# Patient Record
Sex: MALE | Race: WHITE | Employment: FULL TIME | URBAN - METROPOLITAN AREA
[De-identification: names, ages, dates, MRNs, and addresses within clinical notes are randomized per-mention and may not be internally consistent; named-entity substitution may affect disease eponyms.]

---

## 2022-09-20 ENCOUNTER — HOSPITAL ENCOUNTER (OUTPATIENT)
Dept: PHYSICAL THERAPY | Age: 57
Discharge: HOME | End: 2022-09-20
Payer: COMMERCIAL

## 2022-09-20 PROCEDURE — 97162 PT EVAL MOD COMPLEX 30 MIN: CPT

## 2022-09-20 PROCEDURE — 97116 GAIT TRAINING THERAPY: CPT

## 2022-09-20 PROCEDURE — 97110 THERAPEUTIC EXERCISES: CPT

## 2022-09-20 NOTE — PROGRESS NOTES
[address] [place] [address] PHYSICAL THERAPY AT [address] [place] [address]  Saeid Haider [address], [address] [address] ,[address], [address]  Phone: [phone]  Fax: [phone] / [address] [address] PHYSICAL THERAPY SERVICES  Patient Name: Hazel Lyle : 1965   Medical   Diagnosis: Left knee pain [M25.562] Treatment Diagnosis: L knee pain   Onset Date: 2022     Referral Source: Jd Smallwood, * Start of Care [facility] [place]): 2022   Prior Hospitalization: See medical history Provider #: [id number]   Prior Level of Function: Walking 2-3 miles 4-5x/week   Comorbidities: H/o L knee menisectomy    Medications: Verified on Patient Summary List   The Plan of Care and following information is based on the information from the initial evaluation.   ===========================================================================================  Assessment / key information:  Patient is a 59 y.o. male who presents to [facility] with complaints of L knee pain after a fall on 2022. Pt reports slipping in shower and falling posterior when his foot was anchored in the corner. MRI on 2022 revealed complete rupture of ACL, MCL  and medial patellofemoral ligament, near complete ruprutre of PCL, complex tear of med meniscus and contusions of lat/med tibial plateau, femoral condyle and tear of joint capsule. Pt reports in recent weeks symptoms have steadily decreased but he is still feeling moments of instability and has not been able to resume recreational walking program. Knee bucking is dec in incidence but still occurs at least 1x/day He is ambulating with a walking stick as needed but  has not been going to store or leaving house out of fear of falling. Pt reports pain has significantly decreased in recent weeks, states occasional 1/10 aching pain with increasing activity level.  Pt presents to clinic with collateral stabilization brace today ambulating with short step lengths, wide DESTINY, dec TKE and significant L lateral trunk lean. L knee ROM approx 5-130 deg (compared to 0-140 on R). QS was fair although there is notable atrophy of L quad specifically VMO. Pt was unable to perform SLR without lag and required PT assistance. There is also some swelling in medial/superior knee and into L calf; no tenderness, redness or warmth. Lachman test positive without end feel, sag sign positive in flexion, MCL laxity testing caused some discomfort and there was TTP along lateral aspect of patella. Patient sings and sx are consistent with discomfort and instability following recent trauma. . An initial HEP was provided to address above deficits. Physical Therapy services will be beneficial in order to decrease pain, improve ROM, strength and stability of LLE in order to resume recreational walking program and return to PLOF.   ===========================================================================================  Eval Complexity: History MEDIUM  Complexity : 1-2 comorbidities / personal factors will impact the outcome/ POC ;  Examination  MEDIUM Complexity : 3 Standardized tests and measures addressing body structure, function, activity limitation and / or participation in recreation ; Presentation MEDIUM Complexity : Evolving with changing characteristics ;   Decision Making MEDIUM Complexity : FOTO score of 26-74; Overall Complexity MEDIUM  Problem List: pain affecting function, decrease ROM, decrease strength, edema affecting function, impaired gait/ balance, decrease ADL/ functional abilitiies, decrease activity tolerance, decrease flexibility/ joint mobility, and decrease transfer abilities   Treatment Plan may include any combination of the following: Therapeutic exercise, Therapeutic activities, Neuromuscular re-education, Physical agent/modality, Gait/balance training, Manual therapy, Patient education, Self Care training, Functional mobility training, Home safety training, and Stair training  Patient / Family readiness to learn indicated by: asking questions, trying to perform skills, and interest  Persons(s) to be included in education: patient (P)  Barriers to Learning/Limitations: None  Measures taken, if barriers to learning:    Patient Goal (s): \"Strengthen ligaments\"   Patient self reported health status: fair  Rehabilitation Potential: good  Short Term Goals: To be accomplished in  3  weeks:  Patient will be independent and compliant with initial HEP. Patient will report decreased pain levels to 0/10 in order to sleep through the night pain-free  Demonstrate ability to perform 3x10 SLR without extensor lag in order to indicate improved stability required for ADLs. Long Term Goals: To be accomplished in  6  weeks:  Patient will be independent and compliant with progressive HEP for L knee stability in order to maintain gains made with physical therapy  Improve FOTO score from 56 to > or = 72 in order to indicate improved ease with ADLs. Demonstrate ability to perform 2x10 6\" lateral step downs in order to indicate improved stability required for ADLs. Report ability to ambulate 30 minutes without reports of knee buckling in order to return to PLOF. Frequency / Duration:   Patient to be seen  2  times per week for 4-6  weeks:  Patient / Caregiver education and instruction: self care, activity modification, brace/ splint application, and exercises  Therapist Signature: Kaylyn Mancera DPT Date: 3/95/2700   Certification Period: NA Time: 2:25 PM   ===========================================================================================  I certify that the above Physical Therapy Services are being furnished while the patient is under my care. I agree with the treatment plan and certify that this therapy is necessary. Physician Signature:        Date:       Time:     Please sign and return to [facility] or you may fax the signed copy to [phone]. Thank you.

## 2022-09-21 NOTE — PROGRESS NOTES
PHYSICAL THERAPY - DAILY TREATMENT NOTE    Patient Name: Jennifer Vanessa        Date: 2022  : 1965   YES Patient  Verified  Visit #:     Insurance: Payor: Halley Mosqueda / Plan: [facility] PPO / Product Type: PPO /      In time: 215 Out time: 315   Total Treatment Time: 55     BCBS/Medicare Time Tracking (below)   Total Timed Codes (min):  45 1:1 Treatment Time:  20     TREATMENT AREA =  Left knee pain [M25.562]    SUBJECTIVE  Pain Level (on 0 to 10 scale):  0-1  / 10   Medication Changes/New allergies or changes in medical history, any new surgeries or procedures? NO    If yes, update Summary List   Subjective Functional Status/Changes:  []  No changes reported     Patient is a 59 y.o. male who presents to [facility] with complaints of L knee pain after a fall on 2022. Pt reports slipping in shower and falling posterior when his foot was anchored in the corner. MRI on 2022 revealed complete rupture of ACL, MCL  and medial patellofemoral ligament, near complete ruprutre of PCL, complex tear of med meniscus and contusions of lat/med tibial plateau, femoral condyle and tear of joint capsule. Modalities Rationale:     decrease edema, decrease inflammation, and decrease pain to improve patient's ability to perform pain-free ADLs.     min [] Estim, type/location:                                      []  att     []  unatt     []  w/US     []  w/ice    []  w/heat    min []  Mechanical Traction: type/lbs                   []  pro   []  sup   []  int   []  cont    []  before manual    []  after manual    min []  Ultrasound, settings/location:      min []  Iontophoresis w/ dexamethasone, location:                                               []  take home patch       []  in clinic   10 min [x]  Ice     []  Heat    location/position: L knee supine with elevation    min []  Vasopneumatic Device, press/temp:    If using vaso (only need to measure limb vaso being performed on)      pre-treatment girth :       post-treatment girth :       measured at (landmark location) :      min []  Other:    [x] Skin assessment post-treatment (if applicable):    [x]  intact    [x]  redness- no adverse reaction                  []redness - adverse reaction:        10 min Therapeutic Exercise:  [x]  See flow sheet   Rationale:      increase ROM, increase strength, and improve coordination to improve the patients ability to perform unlimited ADLs. 10 min Gait Trainin feet with no AD device on level surfaces with emphasis on quad activation during midstance and allowing for TKE at terminal stance   Rationale: To improve ambulation safety and efficiency in order to improve patient's ability to safely ambulate at home for self care. Billed With/As:   [x] TE   [] TA   [] Neuro   [] Self Care Patient Education: [x] Review HEP    [] Progressed/Changed HEP based on:   [] positioning   [] body mechanics   [] transfers   [] heat/ice application    [] other:      Other Objective/Functional Measures:    FOTO 56     Post Treatment Pain Level (on 0 to 10) scale:   1  / 10     ASSESSMENT  Assessment/Changes in Function:     See POC     []  See Progress Note/Recertification   Patient will continue to benefit from skilled PT services to modify and progress therapeutic interventions, address functional mobility deficits, address ROM deficits, address strength deficits, analyze and address soft tissue restrictions, analyze and cue movement patterns, analyze and modify body mechanics/ergonomics, and assess and modify postural abnormalities to attain remaining goals. Progress toward goals / Updated goals:    See POC for new short and long term goals.       PLAN  [x]  Upgrade activities as tolerated YES Continue plan of care   []  Discharge due to :    []  Other:      Therapist: Youlanda Castleman, DPT    Date: 2022 Time: 12:54 PM     Future Appointments   Date Time Provider Department Center   9/22/2022 11:45 AM Angela Jimenez, PT [facility]   9/29/2022  4:15 PM Angela Jimenez, PT [facility]   10/3/2022 10:00 AM Angela Jimenez, PT [facility]   10/5/2022 11:00 AM Malcom Rios, PT [facility]   10/7/2022 10:00 AM Angela Jimenez, PT [facility]   10/10/2022 10:00 AM Angela Jimenez, PT [facility]   10/12/2022 11:00 AM Malcom Rios, PT [facility]   10/14/2022 10:00 AM Angela Jimenez, PT [facility]

## 2022-09-22 ENCOUNTER — HOSPITAL ENCOUNTER (OUTPATIENT)
Dept: PHYSICAL THERAPY | Age: 57
Discharge: HOME | End: 2022-09-22
Payer: COMMERCIAL

## 2022-09-22 PROCEDURE — 97110 THERAPEUTIC EXERCISES: CPT

## 2022-09-22 PROCEDURE — 97014 ELECTRIC STIMULATION THERAPY: CPT

## 2022-09-22 PROCEDURE — 97112 NEUROMUSCULAR REEDUCATION: CPT

## 2022-09-22 PROCEDURE — 97116 GAIT TRAINING THERAPY: CPT

## 2022-09-22 NOTE — PROGRESS NOTES
PHYSICAL THERAPY - DAILY TREATMENT NOTE    Patient Name: Sanju Flores        Date: 2022  : 1965   YES Patient  Verified  Visit #:      of   12  Insurance: Payor: Regan Castro / Plan: [facility] PPO / Product Type: PPO /      In time: 7650 Out time: 1250   Total Treatment Time: 60     BCBS/Medicare Time Tracking (below)   Total Timed Codes (min):  60 1:1 Treatment Time:  45     TREATMENT AREA =  Left knee pain [M25.562]    SUBJECTIVE  Pain Level (on 0 to 10 scale):  0-1  / 10   Medication Changes/New allergies or changes in medical history, any new surgeries or procedures? NO    If yes, update Summary List   Subjective Functional Status/Changes:  []  No changes reported     Pt reports some soreness in lateral patella           Modalities Rationale:     decrease edema, decrease inflammation, and decrease pain to improve patient's ability to perform pain-free ADLs.    15 min [x] Estim, type/location: IFC L knee supine                                     []  att     [x]  unatt     []  w/US     [x]  w/ice    []  w/heat    min []  Mechanical Traction: type/lbs                   []  pro   []  sup   []  int   []  cont    []  before manual    []  after manual    min []  Ultrasound, settings/location:      min []  Iontophoresis w/ dexamethasone, location:                                               []  take home patch       []  in clinic    min []  Ice     []  Heat    location/position:     min []  Vasopneumatic Device, press/temp:    If using vaso (only need to measure limb vaso being performed on)      pre-treatment girth :       post-treatment girth :       measured at (landmark location) :      min []  Other:    [x] Skin assessment post-treatment (if applicable):    [x]  intact    [x]  redness- no adverse reaction                  []redness - adverse reaction:        15 min Therapeutic Exercise:  [x]  See flow sheet   Rationale:      increase ROM, increase strength, improve coordination, and increase proprioception to improve the patients ability to perform unlimited ADLs.      5/NB min Manual Therapy: Inf/sup patellar mobs in supine   Rationale:      decrease pain, increase ROM, increase tissue extensibility, and decrease edema  to improve patient's ability to improve quadricep activation   The manual therapy interventions were performed at a separate and distinct time from the therapeutic activities interventions. 15 min Neuromuscular Re-ed: [x]  See flow sheet   Rationale:    increase ROM, increase strength, improve coordination, and increase proprioception to improve the patients ability to improve stabiltiy during ambulation    10 min Gait Trainin feet x2 with SPC device on level surfaces with emphasis on quad activation during midstance with cues for AD progression   Rationale: To improve ambulation safety and efficiency in order to improve patient's ability to safely ambulate at home for self care. Billed With/As:   [x] TE   [] TA   [] Neuro   [] Self Care Patient Education: [x] Review HEP    [] Progressed/Changed HEP based on:   [] positioning   [] body mechanics   [] transfers   [] heat/ice application    [] other:      Other Objective/Functional Measures:    Initiated TE per FS    Lag present in SLR after 7 reps; required PT assist     Post Treatment Pain Level (on 0 to 10) scale:   0  / 10     ASSESSMENT  Assessment/Changes in Function:     Improved gait mechanics wit use of SPC; pt was enocuraged to use for community ambulation to inc step lengths and dec risk of knee buckling.  Cues throughout to improve quad activation and avoid compensations from hip     []  See Progress Note/Recertification   Patient will continue to benefit from skilled PT services to modify and progress therapeutic interventions, address functional mobility deficits, address ROM deficits, address strength deficits, analyze and address soft tissue restrictions, analyze and cue movement patterns, analyze and modify body mechanics/ergonomics, and assess and modify postural abnormalities to attain remaining goals. Progress toward goals / Updated goals:    Progressing towards STG 2.       PLAN  [x]  Upgrade activities as tolerated YES Continue plan of care   []  Discharge due to :    []  Other:      Therapist: Darrian Marquez DPT    Date: 9/22/2022 Time: 1:36 PM     Future Appointments   Date Time Provider Obdulia Aggarwal   9/28/2022  9:45 AM Marcelina Mcgill, PT [facility]   9/29/2022  4:15 PM Cris Brady, PT [facility]   10/3/2022 10:00 AM Cris Brady, PT [facility]   10/5/2022 11:00 AM Marcelina Mcgill PT [facility]   10/7/2022 10:00 AM Cris Brady PT [facility]   10/10/2022 10:00 AM Cris Brady, PT [facility]   10/12/2022 11:00 AM Marcelina Mcgill PT [facility]   10/14/2022 10:00 AM Cris Brady PT [facility]

## 2022-09-28 ENCOUNTER — HOSPITAL ENCOUNTER (OUTPATIENT)
Dept: PHYSICAL THERAPY | Age: 57
Discharge: HOME | End: 2022-09-28
Payer: COMMERCIAL

## 2022-09-28 PROCEDURE — 97110 THERAPEUTIC EXERCISES: CPT | Performed by: PHYSICAL THERAPIST

## 2022-09-28 PROCEDURE — 97014 ELECTRIC STIMULATION THERAPY: CPT | Performed by: PHYSICAL THERAPIST

## 2022-09-28 PROCEDURE — 97530 THERAPEUTIC ACTIVITIES: CPT | Performed by: PHYSICAL THERAPIST

## 2022-09-28 NOTE — PROGRESS NOTES
PHYSICAL THERAPY - DAILY TREATMENT NOTE    Patient Name: Francesca Batch        Date: 2022  : 1965   YES Patient  Verified  Visit #:   3   of   12  Insurance: Payor: Bao Jhaveri / Plan: [facility] PPO / Product Type: PPO /      In time: 9:45 Out time: 10:45   Total Treatment Time: 60     BCBS/Medicare Time Tracking (below)   Total Timed Codes (min):  45 1:1 Treatment Time:  45     TREATMENT AREA =  Left knee pain [M25.562]    SUBJECTIVE  Pain Level (on 0 to 10 scale):  0-1  / 10   Medication Changes/New allergies or changes in medical history, any new surgeries or procedures? NO    If yes, update Summary List   Subjective Functional Status/Changes:  []  No changes reported     Pt reports knee is feeling a little stronger each day. He is wearing his brace doing his exercises daily. Awaiting a call to have ultrasound study for DVT.           Modalities Rationale:     decrease edema, decrease inflammation, and decrease pain to improve patient's ability to prevent soreness  15 min [x] Estim, type/location: IFC - L knee supine after session                                     []  att     [x]  unatt     []  w/US     [x]  w/ice    []  w/heat    min []  Mechanical Traction: type/lbs                   []  pro   []  sup   []  int   []  cont    []  before manual    []  after manual    min []  Ultrasound, settings/location:      min []  Iontophoresis w/ dexamethasone, location:                                               []  take home patch       []  in clinic    min []  Ice     []  Heat    location/position:     min []  Vasopneumatic Device, press/temp:     min []  Other:    [] Skin assessment post-treatment (if applicable):    []  intact    []  redness- no adverse reaction     []redness - adverse reaction:        35 min Therapeutic Exercise:  [x]  See flow sheet   Rationale:      increase ROM, increase strength, and improve coordination to improve the patients ability to increase activity tolerance     10 min Therapeutic Activity: [x]  See flow sheet   Rationale:    increase ROM, increase strength, and improve coordination to improve the patients ability to regain ability to climb stairs safely      Billed With/As:   [] TE   [] TA   [] Neuro   [] Self Care Patient Education: [x] Review HEP    [] Progressed/Changed HEP based on:   [] positioning   [] body mechanics   [] transfers   [] heat/ice application    [] other:      Other Objective/Functional Measures: Added 4 inch step ups today, and he required VCs to fully extend knee before his R foot came up on step    Added sidelying hip ABD AROM and recumbent bike today    Moderate swelling remains in his L knee and into lower leg     Post Treatment Pain Level (on 0 to 10) scale:   0-1  / 10     ASSESSMENT  Assessment/Changes in Function:     Pt was jay to perform SLR without a lag once given cues to perform strong quad set before SLR. []  See Progress Note/Recertification   Patient will continue to benefit from skilled PT services to modify and progress therapeutic interventions, address functional mobility deficits, address ROM deficits, address strength deficits, analyze and address soft tissue restrictions, analyze and cue movement patterns, analyze and modify body mechanics/ergonomics, assess and modify postural abnormalities, and address imbalance/dizziness to attain remaining goals.    Progress toward goals / Updated goals:    Pt showing gradual improvements in quad strength and gait mechanHe [facility]     PLAN  [x]  Upgrade activities as tolerated YES Continue plan of care   []  Discharge due to :    []  Other:      Therapist: Matheus Sweet PT    Date: 9/28/2022 Time: 7:58 AM     Future Appointments   Date Time Provider Obdulia Aggarwal   9/28/2022  9:45 AM Sabina Lord, PT [facility]   9/29/2022  4:15 PM Sharif Weinberg PT [facility]   10/3/2022 10:00 AM Sharif Weinberg PT [facility]   10/5/2022 11:00 AM Suzan Colon, PT [facility] 10/7/2022 10:00 AM Bishop Haddad, PT [facility]   10/10/2022 10:00 AM Bishop Haddad, PT [facility]   10/12/2022 11:00 AM Steph Bonner, DORIS [facility]   10/14/2022 10:00 AM Bishop Haddad, PT [facility]

## 2022-09-29 ENCOUNTER — TELEPHONE (OUTPATIENT)
Dept: PHYSICAL THERAPY | Age: 57
End: 2022-09-29

## 2022-09-29 ENCOUNTER — HOSPITAL ENCOUNTER (OUTPATIENT)
Dept: PHYSICAL THERAPY | Age: 57
End: 2022-09-29
Payer: COMMERCIAL

## 2022-09-30 ENCOUNTER — HOSPITAL ENCOUNTER (OUTPATIENT)
Dept: PHYSICAL THERAPY | Age: 57
Discharge: HOME | End: 2022-09-30
Payer: COMMERCIAL

## 2022-09-30 PROCEDURE — 97014 ELECTRIC STIMULATION THERAPY: CPT

## 2022-09-30 PROCEDURE — 97530 THERAPEUTIC ACTIVITIES: CPT

## 2022-09-30 PROCEDURE — 97110 THERAPEUTIC EXERCISES: CPT

## 2022-09-30 NOTE — PROGRESS NOTES
PHYSICAL THERAPY - DAILY TREATMENT NOTE    Patient Name: Lavell Ruiz        Date: 2022  : 1965   YES Patient  Verified  Visit #:      12  Insurance: Payor: Alphnoso Alejandra / Plan: [facility] PPO / Product Type: PPO /      In time: 1100 Out time: 5362   Total Treatment Time: 60     BCBS/Medicare Time Tracking (below)   Total Timed Codes (min):  60 1:1 Treatment Time:  45     TREATMENT AREA =  Left knee pain [M25.562]    SUBJECTIVE  Pain Level (on 0 to 10 scale):  0-1  / 10   Medication Changes/New allergies or changes in medical history, any new surgeries or procedures? NO    If yes, update Summary List   Subjective Functional Status/Changes:  []  No changes reported     My swelling is bad some days and goes down others; I stil have not been for an ultrasound          Modalities Rationale:     decrease edema, decrease inflammation, and decrease pain to improve patient's ability to perform pain-free ADLs.    15 min [x] Estim, type/location: IFC L knee supine                                     [x]  att     []  unatt     []  w/US     [x]  w/ice    []  w/heat    min []  Mechanical Traction: type/lbs                   []  pro   []  sup   []  int   []  cont    []  before manual    []  after manual    min []  Ultrasound, settings/location:      min []  Iontophoresis w/ dexamethasone, location:                                               []  take home patch       []  in clinic    min []  Ice     []  Heat    location/position:     min []  Vasopneumatic Device, press/temp:    If using vaso (only need to measure limb vaso being performed on)      pre-treatment girth :       post-treatment girth :       measured at (landmark location) :      min []  Other:    [x] Skin assessment post-treatment (if applicable):    [x]  intact    [x]  redness- no adverse reaction                  []redness - adverse reaction:        30 min Therapeutic Exercise:  [x]  See flow sheet   Rationale:      increase ROM, increase strength, improve coordination, improve balance, and increase proprioception to improve the patients ability to perform unlimited ADLs. 15 min Therapeutic Activity: [x]  See flow sheet   Rationale:    increase ROM, increase strength, improve coordination, and increase proprioception to improve the patients ability to negotiate stairs and curbs without discomfort      Billed With/As:   [x] TE   [] TA   [] Neuro   [] Self Care Patient Education: [x] Review HEP    [] Progressed/Changed HEP based on:   [] positioning   [] body mechanics   [] transfers   [] heat/ice application    [] other:      Other Objective/Functional Measures:    Progresed to 6 in step for step ups and increased reps as appropriate     Post Treatment Pain Level (on 0 to 10) scale:   0-1  / 10     ASSESSMENT  Assessment/Changes in Function:     Pt required cues throughout for appropriate quad activation and controlled eccentric movements. Pt was encouraged to elevate legs at least 2x/day to help with swelling. []  See Progress Note/Recertification   Patient will continue to benefit from skilled PT services to modify and progress therapeutic interventions, address functional mobility deficits, address ROM deficits, address strength deficits, analyze and address soft tissue restrictions, analyze and cue movement patterns, analyze and modify body mechanics/ergonomics, and assess and modify postural abnormalities to attain remaining goals. Progress toward goals / Updated goals:    Progressing towards STG 3.       PLAN  [x]  Upgrade activities as tolerated YES Continue plan of care   []  Discharge due to :    []  Other:      Therapist: Diamante Ryan DPT    Date: 9/30/2022 Time: 12:15 PM     Future Appointments   Date Time Provider Obdulia Aggarwal   10/3/2022 10:40 AM Andrew Solis PT [facility]   10/5/2022 11:00 AM Andrew Solis PT [facility]   10/7/2022 10:00 AM DORIS Caballero [facility]   10/10/2022 10:00 AM Alex Smallwood, PT [facility] [place] [facility]   10/12/2022 11:00 AM Francisco Javier Adam, PT [facility]   10/14/2022 10:00 AM Alex Smallwood, PT [facility]

## 2022-10-03 ENCOUNTER — APPOINTMENT (OUTPATIENT)
Dept: PHYSICAL THERAPY | Age: 57
End: 2022-10-03
Payer: COMMERCIAL

## 2022-10-03 ENCOUNTER — HOSPITAL ENCOUNTER (OUTPATIENT)
Dept: PHYSICAL THERAPY | Age: 57
Discharge: HOME | End: 2022-10-03
Payer: COMMERCIAL

## 2022-10-03 PROCEDURE — 97014 ELECTRIC STIMULATION THERAPY: CPT | Performed by: PHYSICAL THERAPIST

## 2022-10-03 PROCEDURE — 97530 THERAPEUTIC ACTIVITIES: CPT | Performed by: PHYSICAL THERAPIST

## 2022-10-03 PROCEDURE — 97110 THERAPEUTIC EXERCISES: CPT | Performed by: PHYSICAL THERAPIST

## 2022-10-03 NOTE — PROGRESS NOTES
PHYSICAL THERAPY - DAILY TREATMENT NOTE    Patient Name: Gloria Shields        Date: 10/3/2022  : 1965   YES Patient  Verified  Visit #:      of   12  Insurance: Payor: Hafsa Vergara / Plan: [id number] PPO / Product Type: PPO /      In time: 10:40 Out time: 11:35   Total Treatment Time: 55     BCBS/Medicare Time Tracking (below)   Total Timed Codes (min):  45 1:1 Treatment Time:  45     TREATMENT AREA =  Left knee pain [M25.562]    SUBJECTIVE  Pain Level (on 0 to 10 scale):  0-1  / 10   Medication Changes/New allergies or changes in medical history, any new surgeries or procedures? NO    If yes, update Summary List   Subjective Functional Status/Changes:  []  No changes reported     Pt reports swelling has been decreasing.           Modalities Rationale:     decrease edema, decrease inflammation, and decrease pain to improve patient's ability to prevent soreness  10 min [x] Estim, type/location: IFC - L knee supine after session                                     []  att     [x]  unatt     []  w/US     [x]  w/ice    []  w/heat    min []  Mechanical Traction: type/lbs                   []  pro   []  sup   []  int   []  cont    []  before manual    []  after manual    min []  Ultrasound, settings/location:      min []  Iontophoresis w/ dexamethasone, location:                                               []  take home patch       []  in clinic    min []  Ice     []  Heat    location/position:     min []  Vasopneumatic Device, press/temp:     min []  Other:    [] Skin assessment post-treatment (if applicable):    []  intact    []  redness- no adverse reaction     []redness - adverse reaction:        35 min Therapeutic Exercise:  [x]  See flow sheet   Rationale:      increase ROM, increase strength, and improve coordination to improve the patients ability to increase activity tolerance     10 min Therapeutic Activity: [x]  See flow sheet   Rationale:    increase ROM, increase strength, and improve coordination to improve the patients ability to regain ability to climb stairs safely      Billed With/As:   [] TE   [] TA   [] Neuro   [] Self Care Patient Education: [x] Review HEP    [] Progressed/Changed HEP based on:   [] positioning   [] body mechanics   [] transfers   [] heat/ice application    [] other:      Other Objective/Functional Measures:    Able to increase height of steps to 6 inches today    Increased reps w SLR to 15 today     Post Treatment Pain Level (on 0 to 10) scale:   0  / 10     ASSESSMENT  Assessment/Changes in Function:     Pt did not wrap his knee last night, and this may have helped with reducing swelling into L lower leg. He will call to schedule ultrasound when eh gets home today. []  See Progress Note/Recertification   Patient will continue to benefit from skilled PT services to modify and progress therapeutic interventions, address functional mobility deficits, address ROM deficits, address strength deficits, analyze and address soft tissue restrictions, analyze and cue movement patterns, analyze and modify body mechanics/ergonomics, assess and modify postural abnormalities, and address imbalance/dizziness to attain remaining goals.    Progress toward goals / Updated goals:    Met STG #3     PLAN  [x]  Upgrade activities as tolerated YES Continue plan of care   []  Discharge due to :    []  Other:      Therapist: Jaye Hinton PT    Date: 10/3/2022 Time: 7:58 AM     Future Appointments   Date Time Provider Obdulia Aggarwal   10/3/2022 10:40 AM Reese Medina, PT [facility]   10/5/2022 11:00 AM Reese Medina PT [facility]   10/7/2022 10:00 AM Nayely Tovar, PT [facility]   10/10/2022 10:00 AM Nayely Tovar, PT [facility]   10/12/2022 11:00 AM DORIS Tuttle [facility]   10/14/2022 10:00 AM Nayely Tovar PT [facility]

## 2022-10-05 ENCOUNTER — HOSPITAL ENCOUNTER (OUTPATIENT)
Dept: PHYSICAL THERAPY | Age: 57
Discharge: HOME | End: 2022-10-05
Payer: COMMERCIAL

## 2022-10-05 PROCEDURE — 97110 THERAPEUTIC EXERCISES: CPT | Performed by: PHYSICAL THERAPIST

## 2022-10-05 PROCEDURE — 97530 THERAPEUTIC ACTIVITIES: CPT | Performed by: PHYSICAL THERAPIST

## 2022-10-05 PROCEDURE — 97014 ELECTRIC STIMULATION THERAPY: CPT | Performed by: PHYSICAL THERAPIST

## 2022-10-05 NOTE — PROGRESS NOTES
PHYSICAL THERAPY - DAILY TREATMENT NOTE    Patient Name: Moi Reilly        Date: 10/5/2022  : 1965   YES Patient  Verified  Visit #:     Insurance: Payor: Dileep Donovan / Plan: [facility] PPO / Product Type: PPO /      In time: 11:00 Out time: 12:00   Total Treatment Time: 55     BCBS/Medicare Time Tracking (below)   Total Timed Codes (min):  45 1:1 Treatment Time:  45     TREATMENT AREA =  Left knee pain [M25.562]    SUBJECTIVE  Pain Level (on 0 to 10 scale):  0-1  / 10   Medication Changes/New allergies or changes in medical history, any new surgeries or procedures? NO    If yes, update Summary List   Subjective Functional Status/Changes:  []  No changes reported     Pt reports he will be having an ultrasound study later today. He also got a reminder that his appt with ortho in on 10/11.           Modalities Rationale:     decrease edema, decrease inflammation, and decrease pain to improve patient's ability to prevent soreness  10 min [x] Estim, type/location: IFC - L knee supine after session                                     []  att     [x]  unatt     []  w/US     [x]  w/ice    []  w/heat    min []  Mechanical Traction: type/lbs                   []  pro   []  sup   []  int   []  cont    []  before manual    []  after manual    min []  Ultrasound, settings/location:      min []  Iontophoresis w/ dexamethasone, location:                                               []  take home patch       []  in clinic    min []  Ice     []  Heat    location/position:     min []  Vasopneumatic Device, press/temp:     min []  Other:    [] Skin assessment post-treatment (if applicable):    []  intact    []  redness- no adverse reaction     []redness - adverse reaction:        35 min Therapeutic Exercise:  [x]  See flow sheet   Rationale:      increase ROM, increase strength, and improve coordination to improve the patients ability to increase activity tolerance     10 min Therapeutic Activity: [x]  See flow sheet   Rationale:    increase ROM, increase strength, and improve coordination to improve the patients ability to regain ability to climb stairs safely      Billed With/As:   [] TE   [] TA   [] Neuro   [] Self Care Patient Education: [x] Review HEP    [] Progressed/Changed HEP based on:   [] positioning   [] body mechanics   [] transfers   [] heat/ice application    [] other:      Other Objective/Functional Measures: Added 1lb to SLR today and increased resistance to green band for TKE in standing     Post Treatment Pain Level (on 0 to 10) scale:   0  / 10     ASSESSMENT  Assessment/Changes in Function:     Able to maintain straight leg with addition of 1lb ankle weight without a lag. Able to increase reps with most exercises today. []  See Progress Note/Recertification   Patient will continue to benefit from skilled PT services to modify and progress therapeutic interventions, address functional mobility deficits, address ROM deficits, address strength deficits, analyze and address soft tissue restrictions, analyze and cue movement patterns, analyze and modify body mechanics/ergonomics, assess and modify postural abnormalities, and address imbalance/dizziness to attain remaining goals.    Progress toward goals / Updated goals:    Making good progress with normal walking and improved pain/swelling management     PLAN  [x]  Upgrade activities as tolerated YES Continue plan of care   []  Discharge due to :    []  Other:      Therapist: Arely Azevedo PT    Date: 10/5/2022 Time: 7:58 AM     Future Appointments   Date Time Provider Obdulia Aggarwal   10/5/2022 11:00 AM Tarik Chilel PT [facility]   10/7/2022 10:00 AM Jerry Massey PT [facility]   10/10/2022 10:00 AM Jerry Massey PT [facility]   10/12/2022 11:00 AM Tarik Chilel PT [facility]   10/14/2022 10:00 AM [facility]

## 2022-10-07 ENCOUNTER — HOSPITAL ENCOUNTER (OUTPATIENT)
Dept: PHYSICAL THERAPY | Age: 57
Discharge: HOME | End: 2022-10-07
Payer: COMMERCIAL

## 2022-10-07 PROCEDURE — 97110 THERAPEUTIC EXERCISES: CPT

## 2022-10-07 PROCEDURE — 97014 ELECTRIC STIMULATION THERAPY: CPT

## 2022-10-07 PROCEDURE — 97530 THERAPEUTIC ACTIVITIES: CPT

## 2022-10-07 NOTE — PROGRESS NOTES
PHYSICAL THERAPY - DAILY TREATMENT NOTE    Patient Name: Rebekah Gamez        Date: 10/7/2022  : 1965   YES Patient  Verified  Visit #:     Insurance: Payor: Travis Calvert / Plan: [facility] PPO / Product Type: PPO /      In time: 1000 Out time: 1100   Total Treatment Time: 60     BCBS/Medicare Time Tracking (below)   Total Timed Codes (min):  60 1:1 Treatment Time:  50     TREATMENT AREA =  Left knee pain [M25.562]    SUBJECTIVE  Pain Level (on 0 to 10 scale):  0-1  / 10   Medication Changes/New allergies or changes in medical history, any new surgeries or procedures? NO    If yes, update Summary List   Subjective Functional Status/Changes:  []  No changes reported     Pt reports ultrasound revealed multiple clots in LLE and he has started dose of Xarelto. MD states he is OK to continue PT. Modalities Rationale:     decrease edema, decrease inflammation, and decrease pain to improve patient's ability to perform pain-free ADLs.    10 min [x] Estim, type/location: IFC L knee supine with elevation                                     []  att     [x]  unatt     []  w/US     [x]  w/ice    []  w/heat    min []  Mechanical Traction: type/lbs                   []  pro   []  sup   []  int   []  cont    []  before manual    []  after manual    min []  Ultrasound, settings/location:      min []  Iontophoresis w/ dexamethasone, location:                                               []  take home patch       []  in clinic    min []  Ice     []  Heat    location/position:     min []  Vasopneumatic Device, press/temp:    If using vaso (only need to measure limb vaso being performed on)      pre-treatment girth :       post-treatment girth :       measured at (landmark location) :      min []  Other:    [] Skin assessment post-treatment (if applicable):    []  intact    []  redness- no adverse reaction                  []redness - adverse reaction:        35 min Therapeutic Exercise: [x]  See flow sheet   Rationale:      increase ROM, increase strength, improve coordination, and increase proprioception to improve the patients ability to perform unlimited ADLs. 15 min Therapeutic Activity: [x]  See flow sheet   Rationale:    increase ROM, increase strength, improve coordination, and increase proprioception to improve the patients ability to negotiate stairs without difficulty      Billed With/As:   [x] TE   [] TA   [] Neuro   [] Self Care Patient Education: [x] Review HEP    [] Progressed/Changed HEP based on:   [] positioning   [] body mechanics   [] transfers   [] heat/ice application    [] other:      Other Objective/Functional Measures: Added 4\" lateral step down      Post Treatment Pain Level (on 0 to 10) scale:   0  / 10     ASSESSMENT  Assessment/Changes in Function:     See PN to MD     []  See Progress Note/Recertification   Patient will continue to benefit from skilled PT services to modify and progress therapeutic interventions, address functional mobility deficits, address ROM deficits, address strength deficits, analyze and address soft tissue restrictions, analyze and cue movement patterns, analyze and modify body mechanics/ergonomics, and assess and modify postural abnormalities to attain remaining goals.    Progress toward goals / Updated goals:    See PN to MD     PLAN  [x]  Upgrade activities as tolerated YES Continue plan of care   []  Discharge due to :    []  Other:      Therapist: Al Moreira DPT    Date: 10/7/2022 Time: 12:55 PM     Future Appointments   Date Time Provider Obdulia Aggarwal   10/10/2022 10:00 AM Toya Lara, PT [facility]   10/12/2022 11:00 AM [facility]   10/14/2022 10:00 AM Toya Lara, PT [facility]

## 2022-10-07 NOTE — PROGRESS NOTES
[facility] at [address] [facility] [address] [facility], [address] [place] [address]  Phone [phone]   Fax: [phone]    Physical Therapy Home E-STIM/NMES Unit Recommendation      Patient's name Leti Wong   Referring Physician:Trevor   Treatment Diagnosis: L knee instability  YOB: 1965  MRN: [id number]     Onset Date:8/13/2022  Date of Service:10/7/2022    Total Treatments: 7       Patient may benefit from E-STIM/NMES unit due to atrophy and pain in the L knee given benefits with use of E-STIM with physical therapy treatments. If you are in agreement, please sign below. Thank you for this referral.        Thank you,  Therapist: Katie Menon, PT  Date: 10/7/2022  Time:10:12 AM  _______________________________________________________________________    I certify that the above Therapy Services are being furnished while the patient is under my care. I agree with the treatment plan and certify that this therapy is necessary. Y or N I have read the above and request that my patient continue as recommended. Y or N I have read the above report and request that my patient continue therapy with the following changes/special instructions:______________________________________________________________  Y or N I have read the above report and do not wish to have a home ESTIM unit provided    Physician's Signature:____________________  Date:________________    Please sign and return to [facility] at [address] [facility] [address] [facility], [address] [place] [address]  Or fax to [phone].

## 2022-10-07 NOTE — PROGRESS NOTES
[address] [place] [address] PHYSICAL THERAPY AT [address] [place] [address]  [address], [address] ,[address], [address] [facility]  Phone: [phone]  Fax: [phone]  PROGRESS NOTE  Patient Name: Ham Vick : 1965   Treatment/Medical Diagnosis: Left knee pain [M25.562]   Referral Source: Kate Wills, *     Date of Initial Visit: 2022 Attended Visits: 7 Missed Visits: 0     SUMMARY OF TREATMENT  Stretching and strengthening of LLE, balance and proprioception training, stair training, HEP prescription, Pt education, CP and IFC for inflammation and pain control  CURRENT STATUS  Mr. Libby Hall is currently 2 weeks out from fall on 2022 resulting in  complete rupture of ACL, MCL  and medial patellofemoral ligament, near complete ruprutre of PCL, complex tear of med meniscus and contusions of lat/med tibial plateau, femoral condyle and tear of joint capsule. Pt is now reporting 0-1/10 stiffness/discomfort in L knee but is demonstrating improved tolerance to therex. Swelling in L knee is improving but calf swelling remains and pt was confirmed to have multiple blood clots in L calf and has begun dose of Xarelto. He continues to wear brace for community ambulation and shows fair stability with stair training in clinic with UE support. Gait mechanics are improving although pt is fearful of taking larger steps due to fear of falling. He is eager to resume walking program but was encouraged to continue working to dec swelling prior to initiating walking. See below for objective measures:    Goal/Measure of Progress Goal Met? 1. Patient will be independent and compliant with initial HEP. Status at last Eval: - Current Status: Independent and compliant 3-4x/week yes   2. Patient will report decreased pain levels to 0/10 in order to sleep through the night pain-free   Status at last Eval: 3/10 at best  4/10 at worst Current Status: 0/10 at best  1/10 at worst yes   3.   Demonstrate ability to perform 3x10 SLR without extensor lag in order to indicate improved stability required for ADLs. Status at last Eval: Unable to perform SLR without lag and required PT assistance Current Status: 2x10 and 1x4 with 1# weight before onset of mild extensor lag Progressing       New Goals to be achieved in __4__  weeks:  1. Patient will be independent and compliant with progressive HEP for L knee stability in order to maintain gains made with physical therapy   2. Improve FOTO score from 56 to > or = 72 in order to indicate improved ease with ADLs. 3.  Demonstrate ability to perform 2x10 6\" lateral step downs in order to indicate improved stability required for ADLs   4. Report ability to ambulate 30 minutes without reports of knee buckling in order to return to OF. RECOMMENDATIONS  Please advise plan regarding surgery. If surgery is not considered, plan to continue with skilled PT services 2x/week for 4 weeks to continue progressing towards LTG's and facilitate safe return to recreational activity. Thank you! If you have any questions/comments please contact us directly at [phone]. Thank you for allowing us to assist in the care of your patient. Therapist Signature: Donald Garcia DPT Date: 10/7/2022     Time: 10:16 AM   NOTE TO PHYSICIAN:  PLEASE COMPLETE THE ORDERS BELOW AND FAX TO   [facility]: [phone]. If you are unable to process this request in 24 hours please contact our office: [phone].    ___ I have read the above report and request that my patient continue as recommended.   ___ I have read the above report and request that my patient continue therapy with the following changes/special instructions:_________________________________________________________   ___ I have read the above report and request that my patient be discharged from therapy.      Physician Signature:        Date:       Time:

## 2022-10-10 ENCOUNTER — HOSPITAL ENCOUNTER (OUTPATIENT)
Dept: PHYSICAL THERAPY | Age: 57
Discharge: HOME | End: 2022-10-10
Payer: COMMERCIAL

## 2022-10-10 PROCEDURE — 97110 THERAPEUTIC EXERCISES: CPT

## 2022-10-10 PROCEDURE — 97530 THERAPEUTIC ACTIVITIES: CPT

## 2022-10-10 PROCEDURE — 97014 ELECTRIC STIMULATION THERAPY: CPT

## 2022-10-10 NOTE — PROGRESS NOTES
PHYSICAL THERAPY - DAILY TREATMENT NOTE    Patient Name: Jesusita Harris        Date: 10/10/2022  : 1965   YES Patient  Verified  Visit #:      12  Insurance: Payor: Rebecca Peterson / Plan: [facility] PPO / Product Type: PPO /      In time: 1000 Out time: 1055   Total Treatment Time: 55     BCBS/Medicare Time Tracking (below)   Total Timed Codes (min):  55 1:1 Treatment Time:  45     TREATMENT AREA =  Left knee pain [M25.562]    SUBJECTIVE  Pain Level (on 0 to 10 scale):  0-1  / 10   Medication Changes/New allergies or changes in medical history, any new surgeries or procedures? NO    If yes, update Summary List   Subjective Functional Status/Changes:  []  No changes reported     Pt reports some soreness the following day that improved over the weekend. States he is not elevating as often as he should         Modalities Rationale:     decrease edema, decrease inflammation, and decrease pain to improve patient's ability to perform pain-free ADLs.    10 min [x] Estim, type/location: IFC L knee supine with elevation                                     []  att     [x]  unatt     []  w/US     [x]  w/ice    []  w/heat    min []  Mechanical Traction: type/lbs                   []  pro   []  sup   []  int   []  cont    []  before manual    []  after manual    min []  Ultrasound, settings/location:      min []  Iontophoresis w/ dexamethasone, location:                                               []  take home patch       []  in clinic    min []  Ice     []  Heat    location/position:     min []  Vasopneumatic Device, press/temp:    If using vaso (only need to measure limb vaso being performed on)      pre-treatment girth :       post-treatment girth :       measured at (landmark location) :      min []  Other:    [x] Skin assessment post-treatment (if applicable):    [x]  intact    [x]  redness- no adverse reaction                  []redness - adverse reaction:        30 min Therapeutic Exercise: [x]  See flow sheet   Rationale:      increase ROM, increase strength, improve coordination, and increase proprioception to improve the patients ability to perform unlimited ADLs. 15 min Therapeutic Activity: [x]  See flow sheet   Rationale:    increase ROM, increase strength, improve coordination, and increase proprioception to improve the patients ability to negotiate stairs without difficulty      Billed With/As:   [x] TE   [] TA   [] Neuro   [] Self Care Patient Education: [x] Review HEP    [] Progressed/Changed HEP based on:   [] positioning   [] body mechanics   [] transfers   [] heat/ice application    [] other:      Other Objective/Functional Measures:    TE per FS; resumed SL SLR and added 1lb weight     Post Treatment Pain Level (on 0 to 10) scale:   0  / 10     ASSESSMENT  Assessment/Changes in Function:     Cues during ambulation through clinic for mild inc in step length and walk in heel toe pattern to improve quad activation and allow for some push off at terminal stance. Inc rest breaks through standing exercises helped to dec sensation of stiffness by end of session. []  See Progress Note/Recertification   Patient will continue to benefit from skilled PT services to modify and progress therapeutic interventions, address functional mobility deficits, address ROM deficits, address strength deficits, analyze and address soft tissue restrictions, analyze and cue movement patterns, analyze and modify body mechanics/ergonomics, and assess and modify postural abnormalities to attain remaining goals. Progress toward goals / Updated goals:    Progressing towards LTG 3.       PLAN  [x]  Upgrade activities as tolerated YES Continue plan of care   []  Discharge due to :    []  Other:      Therapist: Diamante Ryan DPT    Date: 10/10/2022 Time: 12:55 PM     Future Appointments   Date Time Provider Obdulia Aggarwal   10/12/2022 11:00 AM Juanpablo Wright, PT [facility]   10/14/2022 10:00 AM Remedios Wilson Avel Chin [facility]

## 2022-10-12 ENCOUNTER — HOSPITAL ENCOUNTER (OUTPATIENT)
Dept: PHYSICAL THERAPY | Age: 57
Discharge: HOME | End: 2022-10-12
Payer: COMMERCIAL

## 2022-10-14 ENCOUNTER — HOSPITAL ENCOUNTER (OUTPATIENT)
Dept: PHYSICAL THERAPY | Age: 57
Discharge: HOME | End: 2022-10-14
Payer: COMMERCIAL

## 2022-10-14 PROCEDURE — 97110 THERAPEUTIC EXERCISES: CPT

## 2022-10-14 PROCEDURE — 97530 THERAPEUTIC ACTIVITIES: CPT

## 2022-10-14 PROCEDURE — 97014 ELECTRIC STIMULATION THERAPY: CPT

## 2022-10-14 NOTE — PROGRESS NOTES
PHYSICAL THERAPY - DAILY TREATMENT NOTE    Patient Name: Estela Rodriguez        Date: 10/14/2022  : 1965   YES Patient  Verified  Visit #:     Insurance: Payor: Esperanza Philip / Plan: [facility] PPO / Product Type: PPO /      In time: 1000 Out time: 1100   Total Treatment Time: 60     BCBS/Medicare Time Tracking (below)   Total Timed Codes (min):  60 1:1 Treatment Time:  45     TREATMENT AREA =  Left knee pain [M25.562]    SUBJECTIVE  Pain Level (on 0 to 10 scale):  0  / 10   Medication Changes/New allergies or changes in medical history, any new surgeries or procedures? NO    If yes, update Summary List   Subjective Functional Status/Changes:  []  No changes reported     Pt reports MD is happy with stability of L knee and reports some signs of healing in MCL. Not planning surgery at this point due to swelling and current dose of blood thinners. Modalities Rationale:     decrease edema, decrease inflammation, and decrease pain to improve patient's ability to perform pain-free ADLs.    10 min [x] Estim, type/location: IFC L knee with elevation                                     []  att     [x]  unatt     []  w/US     [x]  w/ice    []  w/heat    min []  Mechanical Traction: type/lbs                   []  pro   []  sup   []  int   []  cont    []  before manual    []  after manual    min []  Ultrasound, settings/location:      min []  Iontophoresis w/ dexamethasone, location:                                               []  take home patch       []  in clinic    min []  Ice     []  Heat    location/position:     min []  Vasopneumatic Device, press/temp:    If using vaso (only need to measure limb vaso being performed on)      pre-treatment girth :       post-treatment girth :       measured at (landmark location) :      min []  Other:    [x] Skin assessment post-treatment (if applicable):    [x]  intact    [x]  redness- no adverse reaction                  []redness - adverse reaction:        30/25 min Therapeutic Exercise:  [x]  See flow sheet   Rationale:      increase ROM, increase strength, improve coordination, and increase proprioception to improve the patients ability to perform unlimited ADLs. 15 min Therapeutic Activity: [x]  See flow sheet   Rationale:    increase ROM, increase strength, improve coordination, and increase proprioception to improve the patients ability to negotiate stairs without pain    Billed With/As:   [x] TE   [] TA   [] Neuro   [] Self Care Patient Education: [x] Review HEP    [] Progressed/Changed HEP based on:   [] positioning   [] body mechanics   [] transfers   [] heat/ice application    [] other:      Other Objective/Functional Measures: Added supine bridges and added YTB to mini squats     Post Treatment Pain Level (on 0 to 10) scale:   0  / 10     ASSESSMENT  Assessment/Changes in Function:     Cues throughout session to avoid valgus collapse of L knee; pt did well adjusting with cues but was unable to maintain with higher repetitions     []  See Progress Note/Recertification   Patient will continue to benefit from skilled PT services to modify and progress therapeutic interventions, address functional mobility deficits, address ROM deficits, address strength deficits, analyze and address soft tissue restrictions, analyze and cue movement patterns, analyze and modify body mechanics/ergonomics, and assess and modify postural abnormalities to attain remaining goals. Progress toward goals / Updated goals:    Progressing towards LTG 3. PLAN  [x]  Upgrade activities as tolerated YES Continue plan of care   []  Discharge due to :    []  Other:      Therapist: Lyudmila Cordoba DPT    Date: 10/14/2022 Time: 10:07 AM     No future appointments.

## 2022-10-21 ENCOUNTER — HOSPITAL ENCOUNTER (OUTPATIENT)
Dept: PHYSICAL THERAPY | Age: 57
Discharge: HOME | End: 2022-10-21
Payer: COMMERCIAL

## 2022-10-21 PROCEDURE — 97530 THERAPEUTIC ACTIVITIES: CPT

## 2022-10-21 PROCEDURE — 97014 ELECTRIC STIMULATION THERAPY: CPT

## 2022-10-21 PROCEDURE — 97110 THERAPEUTIC EXERCISES: CPT

## 2022-10-21 NOTE — PROGRESS NOTES
PHYSICAL THERAPY - DAILY TREATMENT NOTE    Patient Name: Davina Romano        Date: 10/21/2022  : 1965   YES Patient  Verified  Visit #:   10   of   12  Insurance: Payor: Saurabh Montgomery / Plan: [id number] PPO / Product Type: PPO /      In time: 1000 Out time:    Total Treatment Time: 60     BCBS/Medicare Time Tracking (below)   Total Timed Codes (min):  60 1:1 Treatment Time:  45     TREATMENT AREA =  Left knee pain [M25.562]    SUBJECTIVE  Pain Level (on 0 to 10 scale):  0  / 10   Medication Changes/New allergies or changes in medical history, any new surgeries or procedures? NO    If yes, update Summary List   Subjective Functional Status/Changes:  []  No changes reported     Pt did a few 1/4 mile walks without pain; increased to 1/2 mile and had significant ms soreness in calf         Modalities Rationale:     decrease edema, decrease inflammation, and decrease pain to improve patient's ability to perform pain-free ADLs.    10 min [x] Estim, type/location: IFC L knee with elevation                                     []  att     [x]  unatt     []  w/US     [x]  w/ice    []  w/heat    min []  Mechanical Traction: type/lbs                   []  pro   []  sup   []  int   []  cont    []  before manual    []  after manual    min []  Ultrasound, settings/location:      min []  Iontophoresis w/ dexamethasone, location:                                               []  take home patch       []  in clinic    min []  Ice     []  Heat    location/position:     min []  Vasopneumatic Device, press/temp:    If using vaso (only need to measure limb vaso being performed on)      pre-treatment girth :       post-treatment girth :       measured at (landmark location) :      min []  Other:    [x] Skin assessment post-treatment (if applicable):    [x]  intact    [x]  redness- no adverse reaction                  []redness - adverse reaction:        30/25 min Therapeutic Exercise:  [x]  See flow sheet Rationale:      increase ROM, increase strength, improve coordination, and increase proprioception to improve the patients ability to perform unlimited ADLs. 15 min Therapeutic Activity: [x]  See flow sheet   Rationale:    increase ROM, increase strength, improve coordination, and increase proprioception to improve the patients ability to negotiate stairs without pain    Billed With/As:   [x] TE   [] TA   [] Neuro   [] Self Care Patient Education: [x] Review HEP    [] Progressed/Changed HEP based on:   [] positioning   [] body mechanics   [] transfers   [] heat/ice application    [] other:      Other Objective/Functional Measures:    Taught standing calf stretch    Increased to B band for TKE and 6\" for lateral step down    Steady decrease in swelling with each visit; less redness in calf today     Post Treatment Pain Level (on 0 to 10) scale:   0  / 10     ASSESSMENT  Assessment/Changes in Function:     Pt is showing improved stability with al standing therex and consistent decrease in ROM. Good understanding of gradual progression of exercises and importance of maintaining low levels of swelling. []  See Progress Note/Recertification   Patient will continue to benefit from skilled PT services to modify and progress therapeutic interventions, address functional mobility deficits, address ROM deficits, address strength deficits, analyze and address soft tissue restrictions, analyze and cue movement patterns, analyze and modify body mechanics/ergonomics, and assess and modify postural abnormalities to attain remaining goals. Progress toward goals / Updated goals:    Progressing towards LTG 3.       PLAN  [x]  Upgrade activities as tolerated YES Continue plan of care   []  Discharge due to :    []  Other:      Therapist: Sheldon Frances DPT    Date: 10/21/2022 Time: 10:07 AM     Future Appointments   Date Time Provider Obdulia Aggarwal   10/28/2022  9:20 AM Radha Chatterjee PT [facility]

## 2022-10-28 ENCOUNTER — HOSPITAL ENCOUNTER (OUTPATIENT)
Dept: PHYSICAL THERAPY | Age: 57
Discharge: HOME | End: 2022-10-28
Payer: COMMERCIAL

## 2022-10-28 PROCEDURE — 97110 THERAPEUTIC EXERCISES: CPT

## 2022-10-28 PROCEDURE — 97112 NEUROMUSCULAR REEDUCATION: CPT

## 2022-10-28 PROCEDURE — 97014 ELECTRIC STIMULATION THERAPY: CPT

## 2022-10-28 NOTE — PROGRESS NOTES
PHYSICAL THERAPY - DAILY TREATMENT NOTE    Patient Name: Rafael Mcmahan        Date: 10/28/2022  : 1965   YES Patient  Verified  Visit #:     Insurance: Payor: Albertha Osler / Plan: [facility] PPO / Product Type: PPO /      In time: 920 Out time: 1020   Total Treatment Time: 60     BCBS/Medicare Time Tracking (below)   Total Timed Codes (min):  55 1:1 Treatment Time:  40     TREATMENT AREA =  Left knee pain [M25.562]    SUBJECTIVE  Pain Level (on 0 to 10 scale):  0  / 10   Medication Changes/New allergies or changes in medical history, any new surgeries or procedures? NO    If yes, update Summary List   Subjective Functional Status/Changes:  []  No changes reported     Pt reports swelling is decreasing and he continues to walk 1/4 mile daily without residual soreness         Modalities Rationale:     decrease edema, decrease inflammation, and decrease pain to improve patient's ability to perform pain-free ADLs.    10 min [x] Estim, type/location: IFC L knee with elevation                                     []  att     [x]  unatt     []  w/US     [x]  w/ice    []  w/heat    min []  Mechanical Traction: type/lbs                   []  pro   []  sup   []  int   []  cont    []  before manual    []  after manual    min []  Ultrasound, settings/location:      min []  Iontophoresis w/ dexamethasone, location:                                               []  take home patch       []  in clinic    min []  Ice     []  Heat    location/position:     min []  Vasopneumatic Device, press/temp:    If using vaso (only need to measure limb vaso being performed on)      pre-treatment girth :       post-treatment girth :       measured at (landmark location) :      min []  Other:    [x] Skin assessment post-treatment (if applicable):    [x]  intact    [x]  redness- no adverse reaction                  []redness - adverse reaction:        30/25 min Therapeutic Exercise:  [x]  See flow sheet Rationale:      increase ROM, increase strength, improve coordination, and increase proprioception to improve the patients ability to perform unlimited ADLs. 15 min Therapeutic Activity: [x]  See flow sheet   Rationale:    increase ROM, increase strength, improve coordination, and increase proprioception to improve the patients ability to negotiate stairs without pain    Billed With/As:   [x] TE   [] TA   [] Neuro   [] Self Care Patient Education: [x] Review HEP    [] Progressed/Changed HEP based on:   [] positioning   [] body mechanics   [] transfers   [] heat/ice application    [] other:      Other Objective/Functional Measures: Added TG SL squat L6     Post Treatment Pain Level (on 0 to 10) scale:   0  / 10     ASSESSMENT  Assessment/Changes in Function:     Good stability in standing although loaded flexion remains difficult; pt flexes trunk instead of knee. Good tolerance to SL TG squat with cues to prevent excessive valgus collapse. []  See Progress Note/Recertification   Patient will continue to benefit from skilled PT services to modify and progress therapeutic interventions, address functional mobility deficits, address ROM deficits, address strength deficits, analyze and address soft tissue restrictions, analyze and cue movement patterns, analyze and modify body mechanics/ergonomics, and assess and modify postural abnormalities to attain remaining goals. Progress toward goals / Updated goals:    Progressing towards LTG 3.       PLAN  [x]  Upgrade activities as tolerated YES Continue plan of care   []  Discharge due to :    []  Other:      Therapist: Rosibel Monet DPT    Date: 10/28/2022 Time: 10:07 AM     Future Appointments   Date Time Provider Obdulia Aggarwal   10/28/2022  9:20 AM Nayely Tovar PT [facility]

## 2022-12-16 NOTE — PROGRESS NOTES
[address] [place] [address] PHYSICAL THERAPY AT [address] [place] [address]  Saeid Wangs [address], [address] [place] [id number], [address]  Phone: [phone]  Fax: [phone] SUMMARY  Patient Name: Willie Parrish : 1965   Treatment/Medical Diagnosis: Left knee pain [M25.562]   Referral Source: Bella Chance, *     Date of Initial Visit: 2022 Attended Visits: 11 Missed Visits: 0     SUMMARY OF TREATMENT  Stretching and strengthening of LLE, balance and proprioception training, stair training, HEP prescription, Pt education, CP and IFC for inflammation and pain control  CURRENT STATUS  Mr. Wenceslao Lyle was seen for 10 follow up visits following a fall on 2022 resulting in  complete rupture of ACL, MCL  and medial patellofemoral ligament, near complete ruprutre of PCL, complex tear of med meniscus and contusions of lat/med tibial plateau, femoral condyle and tear of joint capsule. On final visit on 10/28/2022, pt was reporting 0/10 pain and reported he had increased to 1/2 mile walks without excessive soreness. He continued to have difficulty with loaded flexion but was showing significant improvement in stability in weight bearing positions. Pt has not returned to PT since most recent session on 10/28/2022, so current status is unknown. RECOMMENDATIONS  Discontinue therapy due to lack of attendance or compliance. If you have any questions/comments please contact us directly at [phone]. Thank you for allowing us to assist in the care of your patient.     Therapist Signature: Roxy Olivarez PT Date: 2022     Time: 2:56 PM